# Patient Record
Sex: MALE | Race: WHITE | NOT HISPANIC OR LATINO | URBAN - METROPOLITAN AREA
[De-identification: names, ages, dates, MRNs, and addresses within clinical notes are randomized per-mention and may not be internally consistent; named-entity substitution may affect disease eponyms.]

---

## 2023-08-03 LAB
ALANINE AMINOTRANSFERASE (SGPT) (U/L) IN SER/PLAS: 45 U/L (ref 10–52)
ALBUMIN (G/DL) IN SER/PLAS: 4.3 G/DL (ref 3.4–5)
ALKALINE PHOSPHATASE (U/L) IN SER/PLAS: 60 U/L (ref 33–136)
ANION GAP IN SER/PLAS: 11 MMOL/L (ref 10–20)
ASPARTATE AMINOTRANSFERASE (SGOT) (U/L) IN SER/PLAS: 33 U/L (ref 9–39)
BILIRUBIN TOTAL (MG/DL) IN SER/PLAS: 0.8 MG/DL (ref 0–1.2)
CALCIUM (MG/DL) IN SER/PLAS: 9.1 MG/DL (ref 8.6–10.3)
CARBON DIOXIDE, TOTAL (MMOL/L) IN SER/PLAS: 27 MMOL/L (ref 21–32)
CHLORIDE (MMOL/L) IN SER/PLAS: 104 MMOL/L (ref 98–107)
CREATININE (MG/DL) IN SER/PLAS: 0.75 MG/DL (ref 0.5–1.3)
ERYTHROCYTE DISTRIBUTION WIDTH (RATIO) BY AUTOMATED COUNT: 12.8 % (ref 11.5–14.5)
ERYTHROCYTE MEAN CORPUSCULAR HEMOGLOBIN CONCENTRATION (G/DL) BY AUTOMATED: 34.6 G/DL (ref 32–36)
ERYTHROCYTE MEAN CORPUSCULAR VOLUME (FL) BY AUTOMATED COUNT: 96 FL (ref 80–100)
ERYTHROCYTES (10*6/UL) IN BLOOD BY AUTOMATED COUNT: 4.29 X10E12/L (ref 4.5–5.9)
GFR MALE: >90 ML/MIN/1.73M2
GLUCOSE (MG/DL) IN SER/PLAS: 94 MG/DL (ref 74–99)
HEMATOCRIT (%) IN BLOOD BY AUTOMATED COUNT: 41 % (ref 41–52)
HEMOGLOBIN (G/DL) IN BLOOD: 14.2 G/DL (ref 13.5–17.5)
LEUKOCYTES (10*3/UL) IN BLOOD BY AUTOMATED COUNT: 5.8 X10E9/L (ref 4.4–11.3)
NRBC (PER 100 WBCS) BY AUTOMATED COUNT: 0 /100 WBC (ref 0–0)
PLATELETS (10*3/UL) IN BLOOD AUTOMATED COUNT: 248 X10E9/L (ref 150–450)
POTASSIUM (MMOL/L) IN SER/PLAS: 3.8 MMOL/L (ref 3.5–5.3)
PROTEIN TOTAL: 6.8 G/DL (ref 6.4–8.2)
SODIUM (MMOL/L) IN SER/PLAS: 138 MMOL/L (ref 136–145)
UREA NITROGEN (MG/DL) IN SER/PLAS: 11 MG/DL (ref 6–23)

## 2023-08-15 ENCOUNTER — HOSPITAL ENCOUNTER (OUTPATIENT)
Dept: DATA CONVERSION | Facility: HOSPITAL | Age: 68
End: 2023-08-15
Attending: ORTHOPAEDIC SURGERY | Admitting: ORTHOPAEDIC SURGERY

## 2023-08-15 DIAGNOSIS — M54.16 RADICULOPATHY, LUMBAR REGION: ICD-10-CM

## 2023-08-15 DIAGNOSIS — M48.061 SPINAL STENOSIS, LUMBAR REGION WITHOUT NEUROGENIC CLAUDICATION: ICD-10-CM

## 2023-08-15 DIAGNOSIS — M71.38 OTHER BURSAL CYST, OTHER SITE: ICD-10-CM

## 2023-09-29 VITALS — WEIGHT: 215.61 LBS | BODY MASS INDEX: 27.67 KG/M2 | HEIGHT: 74 IN

## 2023-10-01 NOTE — OP NOTE
PROCEDURE DETAILS    Preoperative Diagnosis:  Spinal stenosis, lumbar, M48.061  Right lumbar radiculopathy, M54.16  Other bursal cyst, other site, M71.38    Postoperative Diagnosis:  Spinal stenosis, lumbar, M48.061  Right lumbar radiculopathy, M54.16  Other bursal cyst, other site, M71.38    Surgeon: Mnauel Shepard  Resident/Fellow/Other Assistant: Shannon Grossman    Procedure:  1. DECOMPRESSIVE LUMBAR LAMINECTOMY L3 & L4 W/EXCISION OF INTRASPINAL EXTRADURAL MASS RIGHT L4-5    Anesthesia: Clarke Busch  Estimated Blood Loss: 50ml  Findings: Large extradural synovial cyst right lateral recess compressing the right L4 nerve and displacing the right L5 nerve.  Specimens(s) Collected: no,     IV Fluids: 1 liter        Operative Report:   The patient was seen in the holding area and his right leg pain was reconfirmed.  Right side of his back was marked with indelible ink.  His history is updated  and signed.   His safety checklist was completed.  Allergy history was reviewed.  Consent was obtained for surgery.  Cefazolin was perioperative antibiotic.    Patient was taken the operating room.  Placed under general anesthesia.  The patient was turned into the knee-chest position and placed on the Proactiv spine table.  A knee-high pneumatic stockings were in place at the time of induction.  The back was  prepped with ChloraPrep.  This was sterilely draped.  A timeout was taken confirm patient, procedure, level at which the procedures to be done, no antibiotics given prior to incision, fire risk was assessed as allergies reviewed.  A marking pen was utilized  to make preplanned incision.  Skin was  With half percent Marcaine with epinephrine.  Total of 20 mL was used.  Paraspinal muscles were also injected.  Skin incision was made deeper dissection  was done with monopolar cautery.  A Kocher was placed on a spinous process.  Lateral x-rays done showing us on the L4 spinous process.  This defined our surgical  levels.  It was marked with indelible ink.  Paraspinal muscles were then elevated over the  L3 and L4 lamina out to the level of the facet joints.  The facet joint capsules were left intact.  Self-retaining retractors were placed.  The interspinous ligament between L4 and 5 and L3 and 4 was then resected.  Spinous process of L4 was removed with  a Leksell rongeur and the lower portion of the L3 spinous process was removed also with a Leksell.  The L4 lamina was then.  The L3 lamina with skin.  Then a curette was used to define the trailing edge of the L4 lamina.  High-speed drill was used to  continue the laminectomy of L4 up to the insertion of the ligamentum flavum.  That point a 4 mm Kerrison was used to complete the laminectomy in the midline.  High-speed drill was used to resect the trailing edge of the L3 lamina up to the insertion of  the ligamentum flavum.  The ligamentum flavum was then removed and the midline at L3-4 using a 4 mm Kerrison.  The right lateral recess was decompressed and the ligamentum flavum and that on the left as well.  Then the ligamentum flavum was resected in  the midline at L4-5.  The right L4-5 facet joint cyst was identified.  It was displacing the thecal sac medialward.  It was compressing right L4 nerve.  It extended down to the L5 nerve.  It was not  from the dura.  It was then removed in a piecemeal  fashion.  The microscope was brought in the field and used for this part of the procedure.  The L4 nerve was identified and was traced into its respective neuroforamen.  The cyst lining was removed.  The undersurface of the right L4-5 facet joint was  scraped with a curette to again remove any fragments of cyst lining.  Then the remaining of fragments of the cyst lining were identified adjacent to the L5 nerve.  These were removed with a Kerrison rongeur.  At the completion of the right L5 nerve and  the right L4 nerve retrace in the respective neuroforamen.  There was no  compression of the nerve root.  The wound was vigorously irrigated.  Bone wax was placed over all bony bleeding surfaces.  Floseal was placed for hemostasis.  Paraspinal muscle bleeders  were controlled with bipolar cautery.  The wound was then closed in layers using #1 Vicryl for the dorsal fascia, 2-0 Vicryl for the subcutaneous tissue, 3-0 strata fix subsequently for the skin followed by skin glue and a Exofin mesh, and dressing.   The patient was then turned in the supine position.  He was awake from anesthesia.  Strict recovery in stable condition.                        Attestation:   Note Completion:  Attending Attestation I performed the procedure without a resident         Electronic Signatures:  Manuel Shepard)  (Signed 15-Aug-2023 11:06)   Authored: Post-Operative Note, Chart Review, Note Completion      Last Updated: 15-Aug-2023 11:06 by Manuel Shepard)